# Patient Record
Sex: FEMALE
[De-identification: names, ages, dates, MRNs, and addresses within clinical notes are randomized per-mention and may not be internally consistent; named-entity substitution may affect disease eponyms.]

---

## 2018-11-29 ENCOUNTER — RESULT REVIEW (OUTPATIENT)
Age: 23
End: 2018-11-29

## 2019-12-02 ENCOUNTER — RESULT REVIEW (OUTPATIENT)
Age: 24
End: 2019-12-02

## 2022-01-17 ENCOUNTER — APPOINTMENT (OUTPATIENT)
Dept: AFTER HOURS CARE | Facility: EMERGENCY ROOM | Age: 27
End: 2022-01-17
Payer: COMMERCIAL

## 2022-01-17 DIAGNOSIS — N39.0 URINARY TRACT INFECTION, SITE NOT SPECIFIED: ICD-10-CM

## 2022-01-17 PROBLEM — Z00.00 ENCOUNTER FOR PREVENTIVE HEALTH EXAMINATION: Status: ACTIVE | Noted: 2022-01-17

## 2022-01-17 PROCEDURE — 99203 OFFICE O/P NEW LOW 30 MIN: CPT | Mod: 95

## 2022-01-17 RX ORDER — NITROFURANTOIN (MONOHYDRATE/MACROCRYSTALS) 25; 75 MG/1; MG/1
100 CAPSULE ORAL
Qty: 10 | Refills: 0 | Status: ACTIVE | COMMUNITY
Start: 2022-01-17 | End: 1900-01-01

## 2022-01-17 NOTE — HISTORY OF PRESENT ILLNESS
[Home] : at home, [unfilled] , at the time of the visit. [Other Location: e.g. Home (Enter Location, City,State)___] : at [unfilled] [Verbal consent obtained from patient] : the patient, [unfilled] [FreeTextEntry8] : 26M, hx PCOS on OCP and spironolactone, now p/w 6hrs urinary frequance, mild dysuria and hematuria. No prior UTIs. No new sexual partners or vag discharge. No back pain or fever or flank pain. LMP 5d ago.

## 2022-01-17 NOTE — PLAN
[With new medications prescribed] : Treat in place: with new medications prescribed [FreeTextEntry1] : rx macrobid\par anticipatory guidance\par pyelo precautions\par pmd vs obgyn follow up